# Patient Record
Sex: MALE
[De-identification: names, ages, dates, MRNs, and addresses within clinical notes are randomized per-mention and may not be internally consistent; named-entity substitution may affect disease eponyms.]

---

## 2017-07-27 PROBLEM — Z00.00 ENCOUNTER FOR PREVENTIVE HEALTH EXAMINATION: Status: ACTIVE | Noted: 2017-07-27

## 2017-07-31 ENCOUNTER — APPOINTMENT (OUTPATIENT)
Dept: ENDOCRINOLOGY | Facility: CLINIC | Age: 32
End: 2017-07-31
Payer: COMMERCIAL

## 2017-07-31 VITALS
BODY MASS INDEX: 23.86 KG/M2 | WEIGHT: 180 LBS | HEART RATE: 76 BPM | DIASTOLIC BLOOD PRESSURE: 75 MMHG | SYSTOLIC BLOOD PRESSURE: 115 MMHG | HEIGHT: 73 IN

## 2017-07-31 DIAGNOSIS — Z80.3 FAMILY HISTORY OF MALIGNANT NEOPLASM OF BREAST: ICD-10-CM

## 2017-07-31 DIAGNOSIS — F17.200 NICOTINE DEPENDENCE, UNSPECIFIED, UNCOMPLICATED: ICD-10-CM

## 2017-07-31 DIAGNOSIS — Z80.42 FAMILY HISTORY OF MALIGNANT NEOPLASM OF PROSTATE: ICD-10-CM

## 2017-07-31 DIAGNOSIS — Z78.9 OTHER SPECIFIED HEALTH STATUS: ICD-10-CM

## 2017-07-31 PROCEDURE — 99406 BEHAV CHNG SMOKING 3-10 MIN: CPT

## 2017-07-31 PROCEDURE — 99204 OFFICE O/P NEW MOD 45 MIN: CPT | Mod: 25

## 2017-07-31 RX ORDER — GINGER ROOT/GINGER ROOT EXT 262.5 MG
CAPSULE ORAL
Refills: 0 | Status: ACTIVE | COMMUNITY

## 2017-07-31 RX ORDER — LEVOTHYROXINE SODIUM 50 UG/1
50 TABLET ORAL
Refills: 0 | Status: ACTIVE | COMMUNITY

## 2017-08-25 LAB
25(OH)D3 SERPL-MCNC: 33.4 NG/ML
ANION GAP SERPL CALC-SCNC: 14 MMOL/L
BUN SERPL-MCNC: 18 MG/DL
CALCIUM SERPL-MCNC: 8.9 MG/DL
CHLORIDE SERPL-SCNC: 103 MMOL/L
CHOLEST SERPL-MCNC: 170 MG/DL
CHOLEST/HDLC SERPL: 2.1 RATIO
CO2 SERPL-SCNC: 24 MMOL/L
CREAT SERPL-MCNC: 1.2 MG/DL
FSH SERPL-MCNC: 6.7 IU/L
GLUCOSE SERPL-MCNC: 107 MG/DL
HBA1C MFR BLD HPLC: 6.9 %
HDLC SERPL-MCNC: 80 MG/DL
LDLC SERPL CALC-MCNC: 82 MG/DL
POTASSIUM SERPL-SCNC: 4.4 MMOL/L
SODIUM SERPL-SCNC: 141 MMOL/L
T4 FREE SERPL-MCNC: 1.1 NG/DL
TESTOST BND SERPL-MCNC: 17.5 PG/ML
TESTOST SERPL-MCNC: 1003.5 NG/DL
TRIGL SERPL-MCNC: 41 MG/DL
TSH SERPL-ACNC: 2.46 UIU/ML

## 2017-10-27 ENCOUNTER — RX RENEWAL (OUTPATIENT)
Age: 32
End: 2017-10-27

## 2018-05-10 ENCOUNTER — APPOINTMENT (OUTPATIENT)
Dept: ENDOCRINOLOGY | Facility: CLINIC | Age: 33
End: 2018-05-10
Payer: COMMERCIAL

## 2018-05-10 VITALS — HEIGHT: 73 IN | BODY MASS INDEX: 26.11 KG/M2 | WEIGHT: 197 LBS

## 2018-05-10 PROCEDURE — 99214 OFFICE O/P EST MOD 30 MIN: CPT

## 2018-05-10 RX ORDER — LANCETS 30 GAUGE
EACH MISCELLANEOUS
Qty: 6 | Refills: 11 | Status: ACTIVE | COMMUNITY
Start: 2018-05-10 | End: 1900-01-01

## 2018-05-11 LAB
25(OH)D3 SERPL-MCNC: 26.4 NG/ML
ALBUMIN SERPL ELPH-MCNC: 4.8 G/DL
ALP BLD-CCNC: 48 U/L
ALT SERPL-CCNC: 24 U/L
ANION GAP SERPL CALC-SCNC: 15 MMOL/L
AST SERPL-CCNC: 31 U/L
BASOPHILS # BLD AUTO: 0.03 K/UL
BASOPHILS NFR BLD AUTO: 0.5 %
BILIRUB SERPL-MCNC: 0.7 MG/DL
BUN SERPL-MCNC: 16 MG/DL
CALCIUM SERPL-MCNC: 9.6 MG/DL
CHLORIDE SERPL-SCNC: 101 MMOL/L
CHOLEST SERPL-MCNC: 158 MG/DL
CHOLEST/HDLC SERPL: 2.2 RATIO
CO2 SERPL-SCNC: 25 MMOL/L
CREAT SERPL-MCNC: 1.02 MG/DL
CREAT SPEC-SCNC: 124 MG/DL
EOSINOPHIL # BLD AUTO: 0.05 K/UL
EOSINOPHIL NFR BLD AUTO: 0.8 %
GLUCOSE SERPL-MCNC: 48 MG/DL
HCT VFR BLD CALC: 42.9 %
HDLC SERPL-MCNC: 72 MG/DL
HGB BLD-MCNC: 14.7 G/DL
IMM GRANULOCYTES NFR BLD AUTO: 0.2 %
LDLC SERPL CALC-MCNC: 79 MG/DL
LYMPHOCYTES # BLD AUTO: 2.3 K/UL
LYMPHOCYTES NFR BLD AUTO: 35.5 %
MAN DIFF?: NORMAL
MCHC RBC-ENTMCNC: 31.3 PG
MCHC RBC-ENTMCNC: 34.3 GM/DL
MCV RBC AUTO: 91.3 FL
MICROALBUMIN 24H UR DL<=1MG/L-MCNC: 0.3 MG/DL
MICROALBUMIN/CREAT 24H UR-RTO: 2 MG/G
MONOCYTES # BLD AUTO: 0.66 K/UL
MONOCYTES NFR BLD AUTO: 10.2 %
NEUTROPHILS # BLD AUTO: 3.43 K/UL
NEUTROPHILS NFR BLD AUTO: 52.8 %
PLATELET # BLD AUTO: 225 K/UL
POTASSIUM SERPL-SCNC: 4.4 MMOL/L
PROT SERPL-MCNC: 7.1 G/DL
RBC # BLD: 4.7 M/UL
RBC # FLD: 12.6 %
SODIUM SERPL-SCNC: 141 MMOL/L
T3 SERPL-MCNC: 100 NG/DL
T4 FREE SERPL-MCNC: 1.1 NG/DL
TRIGL SERPL-MCNC: 37 MG/DL
TSH SERPL-ACNC: 2.21 UIU/ML
WBC # FLD AUTO: 6.48 K/UL

## 2018-05-18 RX ORDER — BLOOD-GLUCOSE METER
KIT MISCELLANEOUS
Qty: 1 | Refills: 0 | Status: ACTIVE | COMMUNITY
Start: 2018-05-18 | End: 1900-01-01

## 2018-09-18 ENCOUNTER — RX RENEWAL (OUTPATIENT)
Age: 33
End: 2018-09-18

## 2019-01-23 ENCOUNTER — RX RENEWAL (OUTPATIENT)
Age: 34
End: 2019-01-23

## 2019-01-29 ENCOUNTER — RX RENEWAL (OUTPATIENT)
Age: 34
End: 2019-01-29

## 2019-01-29 RX ORDER — BLOOD SUGAR DIAGNOSTIC
STRIP MISCELLANEOUS
Qty: 600 | Refills: 0 | Status: ACTIVE | COMMUNITY
Start: 2017-10-09 | End: 1900-01-01

## 2019-02-04 ENCOUNTER — RX CHANGE (OUTPATIENT)
Age: 34
End: 2019-02-04

## 2019-02-04 RX ORDER — INSULIN LISPRO 100 [IU]/ML
100 INJECTION, SOLUTION INTRAVENOUS; SUBCUTANEOUS
Qty: 3 | Refills: 3 | Status: DISCONTINUED | COMMUNITY
End: 2019-02-04

## 2019-03-13 ENCOUNTER — APPOINTMENT (OUTPATIENT)
Dept: ENDOCRINOLOGY | Facility: CLINIC | Age: 34
End: 2019-03-13
Payer: COMMERCIAL

## 2019-03-13 VITALS
BODY MASS INDEX: 26.77 KG/M2 | HEART RATE: 60 BPM | HEIGHT: 73 IN | DIASTOLIC BLOOD PRESSURE: 67 MMHG | WEIGHT: 202 LBS | SYSTOLIC BLOOD PRESSURE: 109 MMHG

## 2019-03-13 LAB
GLUCOSE BLDC GLUCOMTR-MCNC: 125
HBA1C MFR BLD HPLC: 6.5

## 2019-03-13 PROCEDURE — 99214 OFFICE O/P EST MOD 30 MIN: CPT | Mod: 25

## 2019-03-13 PROCEDURE — 83036 HEMOGLOBIN GLYCOSYLATED A1C: CPT | Mod: QW

## 2019-03-13 PROCEDURE — 82962 GLUCOSE BLOOD TEST: CPT

## 2019-03-13 PROCEDURE — 36415 COLL VENOUS BLD VENIPUNCTURE: CPT

## 2019-03-14 NOTE — PHYSICAL EXAM
[No Acute Distress] : no acute distress [Well Nourished] : well nourished [Well Developed] : well developed [EOMI] : extra ocular movement intact [No Proptosis] : no proptosis [Normal Oropharynx] : the oropharynx was normal [Supple] : the neck was supple [No LAD] : no lymphadenopathy [Thyroid Not Enlarged] : the thyroid was not enlarged [No Thyroid Nodules] : there were no palpable thyroid nodules [Normal Rate and Effort] : normal respiratory rhythm and effort [No Accessory Muscle Use] : no accessory muscle use [Clear to Auscultation] : lungs were clear to auscultation bilaterally [Normal Rate] : heart rate was normal  [Normal S1, S2] : normal S1 and S2 [Regular Rhythm] : with a regular rhythm [Gynecomastia] : no gynecomastia [Not Tender] : non-tender [Soft] : abdomen soft [Anterior Cervical Nodes] : anterior cervical nodes [Spine Straight] : spine straight [No Stigmata of Cushings Syndrome] : no stigmata of cushings syndrome [Normal Gait] : normal gait [No Joint Swelling] : no joint swelling seen [No Rash] : no rash [No Skin Lesions] : no skin lesions [No Motor Deficits] : the motor exam was normal [No Sensory Deficits] : the sensory exam was normal to light touch and pinprick [Oriented x3] : oriented to person, place, and time [Normal Insight/Judgement] : insight and judgment were intact [Normal Affect] : the affect was normal [Normal Mood] : the mood was normal [de-identified] : No lipodystrophy at insulin injection site [de-identified] : Deferred [de-identified] : ~0.5 cm left level III calcified LN, unchanged for decades per pt

## 2019-03-14 NOTE — HISTORY OF PRESENT ILLNESS
[FreeTextEntry1] : 32 y/o M w/ Hx of DM1 and HTN presents for evaluation of multiple endocrine issues. \par \par \par Here for f/u, since his LV, he reports feeling well and endorses no acute complaints. He was diagnosed w/ DM1 at age 10. He reports compliance w. Lantus 18 units QHS + Humalog per carb counting (1:10 I to C ratio and corrects w/ ISF of 40). He monitors ~10 times per day. He reprots mostly his FSG are at goal, does endorse post prandial hyperglycemia in the 200 when preceded by carb heavy diet. At the time of this visi he had a low sugar episode w/ only reported symptoms being a headache. This has been happening often and he has required an increase in the frequency of his FSG monitoring. He reports absence of typical alarm signs/symptoms of hypoglycemia. No DKA episodes in the past. He denies any past hx of retinopathy, neuropathy or nephropathy.  \par \par 3/2019: Here for /fu, generally feels well and endorses no acute complaints. No interval events since LV. Today reports excellent glycemic control at home. He continues to monitor FSG 4-6 times daily. rare hypoglycemic episodes do occur but are rare and elicit symptoms. Post prandial FSG remain <160.\par He denies any HA, visual changes, gynecomastia, nipple discharge, hair changes, heat/cold intolerance or any other complaints. He otherwise denies any f/c, CP, SOB, palpitations, tremors, depressed mood, anxiety, palpitations, n/v, stool/urinary abn, skin/weight changes, heat/cold intolerance, HAs, breast/nipple changes, polyuria/polydipsia/nocturia or other complaints.\par

## 2019-03-14 NOTE — ASSESSMENT
[FreeTextEntry1] : 1) DM1 : Last A1C  at 7.2% on 5/2018, now well controlled at 6.3% on 3/2019. At target. No reported complications. Reported infrequent hypoglycemia. Appetite and diet appropriate. We discussed splitting his carbs more evenly to avoid post prandial hyperglycemia and to adjust his I to C to 12. I agree with the need for increased monitoring at least 10 times daily.  Ophtho referral provided, has Podiatry referral, now UTD for 2019. Evaluate annual microalbumin and lipid panel (not on a statin). We discussed CGM option, he has one but feels it doesn’t fit well w/ lifestyle. Reassess in 6 months.\par \par 2) Hypothyroidism: Appears clinically euthyroid. Reassess TFTs. Continue current dose for now.\par \par \par 2) Tobacco use disorder: Discussed quitting, he reports reducing his cigarette use to 1-2 a week, therapeutic options discussed. He'll attempt to quit w/o aids at this time. Reassess on NV. [Carbohydrate Consistent Diet] : carbohydrate consistent diet [Hypoglycemia Management] : hypoglycemia management [Action and use of Insulin] : action and use of short and long-acting insulin [Self Monitoring of Blood Glucose] : self monitoring of blood glucose

## 2019-03-15 LAB
ALBUMIN SERPL ELPH-MCNC: 4.8 G/DL
ALP BLD-CCNC: 43 U/L
ALT SERPL-CCNC: 22 U/L
ANION GAP SERPL CALC-SCNC: 17 MMOL/L
AST SERPL-CCNC: 24 U/L
BILIRUB SERPL-MCNC: 0.6 MG/DL
BUN SERPL-MCNC: 13 MG/DL
CALCIUM SERPL-MCNC: 9.5 MG/DL
CHLORIDE SERPL-SCNC: 102 MMOL/L
CHOLEST SERPL-MCNC: 168 MG/DL
CHOLEST/HDLC SERPL: 2.4 RATIO
CO2 SERPL-SCNC: 26 MMOL/L
CREAT SERPL-MCNC: 1.13 MG/DL
CREAT SPEC-SCNC: 128 MG/DL
GLUCOSE SERPL-MCNC: 59 MG/DL
HDLC SERPL-MCNC: 69 MG/DL
LDLC SERPL CALC-MCNC: 93 MG/DL
MICROALBUMIN 24H UR DL<=1MG/L-MCNC: <1.2 MG/DL
MICROALBUMIN/CREAT 24H UR-RTO: NORMAL MG/G
POTASSIUM SERPL-SCNC: 3.9 MMOL/L
PROT SERPL-MCNC: 6.6 G/DL
SODIUM SERPL-SCNC: 144 MMOL/L
T4 FREE SERPL-MCNC: 1.2 NG/DL
TRIGL SERPL-MCNC: 32 MG/DL
TSH SERPL-ACNC: 1.76 UIU/ML

## 2019-04-23 ENCOUNTER — RX RENEWAL (OUTPATIENT)
Age: 34
End: 2019-04-23

## 2019-04-23 RX ORDER — PEN NEEDLE, DIABETIC 29 G X1/2"
31G X 8 MM NEEDLE, DISPOSABLE MISCELLANEOUS
Qty: 400 | Refills: 3 | Status: ACTIVE | COMMUNITY
Start: 2018-01-19 | End: 1900-01-01

## 2019-06-04 ENCOUNTER — RX RENEWAL (OUTPATIENT)
Age: 34
End: 2019-06-04

## 2019-08-07 ENCOUNTER — RX RENEWAL (OUTPATIENT)
Age: 34
End: 2019-08-07

## 2019-08-27 ENCOUNTER — OTHER (OUTPATIENT)
Age: 34
End: 2019-08-27

## 2019-10-30 ENCOUNTER — RX RENEWAL (OUTPATIENT)
Age: 34
End: 2019-10-30

## 2019-12-03 ENCOUNTER — RX RENEWAL (OUTPATIENT)
Age: 34
End: 2019-12-03

## 2019-12-04 ENCOUNTER — RX RENEWAL (OUTPATIENT)
Age: 34
End: 2019-12-04

## 2020-01-31 ENCOUNTER — APPOINTMENT (OUTPATIENT)
Dept: ENDOCRINOLOGY | Facility: CLINIC | Age: 35
End: 2020-01-31
Payer: COMMERCIAL

## 2020-01-31 VITALS
SYSTOLIC BLOOD PRESSURE: 120 MMHG | WEIGHT: 214 LBS | HEART RATE: 68 BPM | HEIGHT: 73 IN | BODY MASS INDEX: 28.36 KG/M2 | DIASTOLIC BLOOD PRESSURE: 76 MMHG

## 2020-01-31 LAB
GLUCOSE BLDC GLUCOMTR-MCNC: 89
HBA1C MFR BLD HPLC: 6.9

## 2020-01-31 PROCEDURE — 83036 HEMOGLOBIN GLYCOSYLATED A1C: CPT | Mod: QW

## 2020-01-31 PROCEDURE — 82962 GLUCOSE BLOOD TEST: CPT

## 2020-01-31 PROCEDURE — 99215 OFFICE O/P EST HI 40 MIN: CPT | Mod: 25

## 2020-01-31 RX ORDER — BLOOD-GLUCOSE TRANSMITTER
EACH MISCELLANEOUS
Qty: 1 | Refills: 3 | Status: ACTIVE | COMMUNITY
Start: 2020-01-31 | End: 1900-01-01

## 2020-01-31 RX ORDER — BLOOD-GLUCOSE,RECEIVER,CONT
EACH MISCELLANEOUS
Qty: 1 | Refills: 0 | Status: ACTIVE | COMMUNITY
Start: 2020-01-31 | End: 1900-01-01

## 2020-01-31 RX ORDER — BLOOD-GLUCOSE SENSOR
EACH MISCELLANEOUS
Qty: 1 | Refills: 11 | Status: ACTIVE | COMMUNITY
Start: 2020-01-31 | End: 1900-01-01

## 2020-01-31 NOTE — ASSESSMENT
[FreeTextEntry1] : 1) DM1 : Last A1C  at 6.9% on 1/2020. At target. No reported complications. Reported frequent hypoglycemia w/o alarm symptoms in spite of monitoring finger stick glucose ~15 times daily. Appetite and diet appropriate. We discussed splitting his carbs more evenly to avoid post prandial hyperglycemia and to adjust his I to C to 12. I agree with the need for increased monitoring at least 10 times daily.  Ophtho referral provided, has Podiatry referral, ow UTD for 2019. Evaluate annual microalbumin and lipid panel (not on a statin). Given glucose variability and hypoglycemia unawareness in spite of FSG monitoring 15 times daily, he benefits from CGM monitoring. G6 device prescribed.\par \par 2) Hypothyroidism: Appears clinically euthyroid. Reassess TFTs. Continue current dose for now.\par \par 2) Tobacco use disorder: Discussed quitting, he reports reducing his cigarette use to 1-2 a week, therapeutic options discussed. He'll attempt to quit w/o aids at this time. Reassess on NV. [Carbohydrate Consistent Diet] : carbohydrate consistent diet [Hypoglycemia Management] : hypoglycemia management [Action and use of Insulin] : action and use of short and long-acting insulin [Self Monitoring of Blood Glucose] : self monitoring of blood glucose

## 2020-01-31 NOTE — PHYSICAL EXAM
[No Acute Distress] : no acute distress [Well Nourished] : well nourished [Well Developed] : well developed [EOMI] : extra ocular movement intact [No Proptosis] : no proptosis [Normal Oropharynx] : the oropharynx was normal [Supple] : the neck was supple [No LAD] : no lymphadenopathy [Thyroid Not Enlarged] : the thyroid was not enlarged [No Thyroid Nodules] : there were no palpable thyroid nodules [Normal Rate and Effort] : normal respiratory rhythm and effort [No Accessory Muscle Use] : no accessory muscle use [Clear to Auscultation] : lungs were clear to auscultation bilaterally [Normal Rate] : heart rate was normal  [Normal S1, S2] : normal S1 and S2 [Regular Rhythm] : with a regular rhythm [Gynecomastia] : no gynecomastia [Not Tender] : non-tender [Soft] : abdomen soft [Anterior Cervical Nodes] : anterior cervical nodes [Spine Straight] : spine straight [No Stigmata of Cushings Syndrome] : no stigmata of cushings syndrome [Normal Gait] : normal gait [No Joint Swelling] : no joint swelling seen [No Rash] : no rash [No Skin Lesions] : no skin lesions [No Motor Deficits] : the motor exam was normal [No Sensory Deficits] : the sensory exam was normal to light touch and pinprick [Oriented x3] : oriented to person, place, and time [Normal Insight/Judgement] : insight and judgment were intact [Normal Affect] : the affect was normal [Normal Mood] : the mood was normal [de-identified] : No lipodystrophy at insulin injection site [de-identified] : Deferred [de-identified] : ~0.5 cm left level III calcified LN, unchanged for decades per pt

## 2020-01-31 NOTE — HISTORY OF PRESENT ILLNESS
[FreeTextEntry1] : 35 y/o M w/ Hx of DM1 and HTN presents for evaluation of multiple endocrine issues. \par \par \par Here for f/u, since his LV, he reports feeling well and endorses no acute complaints. He was diagnosed w/ DM1 at age 10. He reports compliance w. Lantus 18 units QHS + Humalog per carb counting (1:10 I to C ratio and corrects w/ ISF of 40). He monitors ~10 times per day. He reprots mostly his FSG are at goal, does endorse post prandial hyperglycemia in the 200 when preceded by carb heavy diet. At the time of this visi he had a low sugar episode w/ only reported symptoms being a headache. This has been happening often and he has required an increase in the frequency of his FSG monitoring. He reports absence of typical alarm signs/symptoms of hypoglycemia. No DKA episodes in the past. He denies any past hx of retinopathy, neuropathy or nephropathy.  \par \par 1/2020: Here for /fu, generally feels well and endorses no acute complaints. No interval events since LV. Today reports excellent glycemic control at home. He continues to monitor FSG 15 times daily. hypoglycemic episodes do occur but do not elicit symptoms. Post prandial FSG remain <160.\par He denies any HA, visual changes, gynecomastia, nipple discharge, hair changes, heat/cold intolerance or any other complaints. He otherwise denies any f/c, CP, SOB, palpitations, tremors, depressed mood, anxiety, palpitations, n/v, stool/urinary abn, skin/weight changes, heat/cold intolerance, HAs, breast/nipple changes, polyuria/polydipsia/nocturia or other complaints.\par

## 2020-02-07 LAB
25(OH)D3 SERPL-MCNC: 27.3 NG/ML
ALBUMIN SERPL ELPH-MCNC: 5 G/DL
ALP BLD-CCNC: 49 U/L
ALT SERPL-CCNC: 17 U/L
ANION GAP SERPL CALC-SCNC: 14 MMOL/L
APO LP(A) SERPL-MCNC: <9 NMOL/L
AST SERPL-CCNC: 21 U/L
BILIRUB SERPL-MCNC: 0.7 MG/DL
BUN SERPL-MCNC: 16 MG/DL
CALCIUM SERPL-MCNC: 9.7 MG/DL
CHLORIDE SERPL-SCNC: 105 MMOL/L
CHOLEST SERPL-MCNC: 177 MG/DL
CHOLEST/HDLC SERPL: 2.3 RATIO
CO2 SERPL-SCNC: 23 MMOL/L
CREAT SERPL-MCNC: 1.08 MG/DL
CREAT SPEC-SCNC: 305 MG/DL
CRP SERPL HS-MCNC: 0.48 MG/L
GLUCOSE SERPL-MCNC: 80 MG/DL
HDLC SERPL-MCNC: 77 MG/DL
LDLC SERPL CALC-MCNC: 92 MG/DL
MICROALBUMIN 24H UR DL<=1MG/L-MCNC: 1.3 MG/DL
MICROALBUMIN/CREAT 24H UR-RTO: 4 MG/G
POTASSIUM SERPL-SCNC: 4.4 MMOL/L
PROT SERPL-MCNC: 6.7 G/DL
SODIUM SERPL-SCNC: 142 MMOL/L
T4 FREE SERPL-MCNC: 1.3 NG/DL
TRIGL SERPL-MCNC: 42 MG/DL
TSH SERPL-ACNC: 2.05 UIU/ML

## 2020-02-28 ENCOUNTER — RX RENEWAL (OUTPATIENT)
Age: 35
End: 2020-02-28

## 2020-03-23 ENCOUNTER — RX RENEWAL (OUTPATIENT)
Age: 35
End: 2020-03-23

## 2020-05-08 ENCOUNTER — RX RENEWAL (OUTPATIENT)
Age: 35
End: 2020-05-08

## 2020-06-02 ENCOUNTER — RX RENEWAL (OUTPATIENT)
Age: 35
End: 2020-06-02

## 2020-07-31 ENCOUNTER — APPOINTMENT (OUTPATIENT)
Dept: ENDOCRINOLOGY | Facility: CLINIC | Age: 35
End: 2020-07-31

## 2020-08-05 ENCOUNTER — RX RENEWAL (OUTPATIENT)
Age: 35
End: 2020-08-05

## 2020-08-19 ENCOUNTER — APPOINTMENT (OUTPATIENT)
Dept: ENDOCRINOLOGY | Facility: CLINIC | Age: 35
End: 2020-08-19
Payer: COMMERCIAL

## 2020-08-19 VITALS
WEIGHT: 221 LBS | BODY MASS INDEX: 29.29 KG/M2 | SYSTOLIC BLOOD PRESSURE: 113 MMHG | HEART RATE: 64 BPM | DIASTOLIC BLOOD PRESSURE: 83 MMHG | HEIGHT: 73 IN

## 2020-08-19 LAB
GLUCOSE BLDC GLUCOMTR-MCNC: 159
HBA1C MFR BLD HPLC: 7.1

## 2020-08-19 PROCEDURE — 99215 OFFICE O/P EST HI 40 MIN: CPT | Mod: 25

## 2020-08-19 PROCEDURE — 82962 GLUCOSE BLOOD TEST: CPT

## 2020-08-19 PROCEDURE — 83036 HEMOGLOBIN GLYCOSYLATED A1C: CPT | Mod: QW

## 2020-08-27 LAB
T3 SERPL-MCNC: 102 NG/DL
T4 FREE SERPL-MCNC: 1.2 NG/DL
TSH SERPL-ACNC: 2.51 UIU/ML

## 2020-09-02 ENCOUNTER — RX RENEWAL (OUTPATIENT)
Age: 35
End: 2020-09-02

## 2020-09-16 NOTE — HISTORY OF PRESENT ILLNESS
[FreeTextEntry1] : 33 y/o M w/ Hx of DM1 and HTN presents for evaluation of multiple endocrine issues. \par \par \par Here for f/u, since his LV, he reports feeling well and endorses no acute complaints. He was diagnosed w/ DM1 at age 10. He reports compliance w. Lantus 18 units QHS + Humalog per carb counting (1:10 I to C ratio and corrects w/ ISF of 40). He monitors ~10 times per day. He reprots mostly his FSG are at goal, does endorse post prandial hyperglycemia in the 200 when preceded by carb heavy diet. At the time of this visi he had a low sugar episode w/ only reported symptoms being a headache. This has been happening often and he has required an increase in the frequency of his FSG monitoring. He reports absence of typical alarm signs/symptoms of hypoglycemia. No DKA episodes in the past. He denies any past hx of retinopathy, neuropathy or nephropathy.  \par \par 8/2020: Here for /fu, generally feels well and endorses no acute complaints. No interval events since LV. Today reports excellent glycemic control at home. He continues to monitor FSG 15 times daily. hypoglycemic episodes do occur but do not elicit symptoms. Post prandial FSG remain <160. weight gain noted, reported as 2/2 reduced physical activity during pandemic.\par He denies any HA, visual changes, gynecomastia, nipple discharge, hair changes, heat/cold intolerance or any other complaints. He otherwise denies any f/c, CP, SOB, palpitations, tremors, depressed mood, anxiety, palpitations, n/v, stool/urinary abn, skin/weight changes, heat/cold intolerance, HAs, breast/nipple changes, polyuria/polydipsia/nocturia or other complaints.\par

## 2020-09-16 NOTE — ASSESSMENT
[Carbohydrate Consistent Diet] : carbohydrate consistent diet [Hypoglycemia Management] : hypoglycemia management [Action and use of Insulin] : action and use of short and long-acting insulin [Self Monitoring of Blood Glucose] : self monitoring of blood glucose [FreeTextEntry1] : 1) DM1 : Last A1C  at 6.9% on 1/2020. At target. No reported complications. Reported frequent hypoglycemia w/o alarm symptoms in spite of monitoring finger stick glucose ~15 times daily. Appetite and diet appropriate. We discussed splitting his carbs more evenly to avoid post prandial hyperglycemia and to adjust his I to C to 12. I agree with the need for increased monitoring at least 10 times daily.  Ophtho referral provided, has Podiatry referral, ow UTD for 2020. Evaluate annual microalbumin and lipid panel (not on a statin). Given glucose variability and hypoglycemia unawareness in spite of FSG monitoring 15 times daily, he benefits from CGM monitoring. G6 device prescribed. consider metformin to reduce insulin resistence and aid with weight gain in the future. .insulni\par \par \par 2) Hypothyroidism: Appears clinically euthyroid. Reassess TFTs. Continue current dose for now.\par \par 2) Tobacco use disorder: Discussed quitting, he reports reducing his cigarette use to 1-2 a week, therapeutic options discussed. He'll attempt to quit w/o aids at this time. Reassess on NV.

## 2020-12-03 ENCOUNTER — RX RENEWAL (OUTPATIENT)
Age: 35
End: 2020-12-03

## 2020-12-08 ENCOUNTER — RX RENEWAL (OUTPATIENT)
Age: 35
End: 2020-12-08

## 2021-01-25 ENCOUNTER — RX RENEWAL (OUTPATIENT)
Age: 36
End: 2021-01-25

## 2021-02-02 ENCOUNTER — APPOINTMENT (OUTPATIENT)
Dept: ENDOCRINOLOGY | Facility: CLINIC | Age: 36
End: 2021-02-02

## 2021-03-04 ENCOUNTER — RX RENEWAL (OUTPATIENT)
Age: 36
End: 2021-03-04

## 2021-03-17 ENCOUNTER — APPOINTMENT (OUTPATIENT)
Dept: ENDOCRINOLOGY | Facility: CLINIC | Age: 36
End: 2021-03-17
Payer: COMMERCIAL

## 2021-03-17 VITALS
HEIGHT: 73 IN | WEIGHT: 228 LBS | DIASTOLIC BLOOD PRESSURE: 82 MMHG | SYSTOLIC BLOOD PRESSURE: 125 MMHG | BODY MASS INDEX: 30.22 KG/M2 | HEART RATE: 59 BPM

## 2021-03-17 LAB
GLUCOSE BLDC GLUCOMTR-MCNC: 200
HBA1C MFR BLD HPLC: 6.7

## 2021-03-17 PROCEDURE — 99215 OFFICE O/P EST HI 40 MIN: CPT | Mod: 25

## 2021-03-17 PROCEDURE — 83036 HEMOGLOBIN GLYCOSYLATED A1C: CPT | Mod: QW

## 2021-03-17 PROCEDURE — 82962 GLUCOSE BLOOD TEST: CPT

## 2021-03-17 PROCEDURE — 99072 ADDL SUPL MATRL&STAF TM PHE: CPT

## 2021-03-19 NOTE — HISTORY OF PRESENT ILLNESS
[FreeTextEntry1] : 34 y/o M w/ Hx of DM1 and HTN presents for evaluation of multiple endocrine issues. \par \par \par Here for f/u, since his LV, he reports feeling well and endorses no acute complaints. He was diagnosed w/ DM1 at age 10. He reports compliance w. Lantus 18 units QHS + Humalog per carb counting (1:10 I to C ratio and corrects w/ ISF of 40). He monitors ~10 times per day. He reprots mostly his FSG are at goal, does endorse post prandial hyperglycemia in the 200 when preceded by carb heavy diet. At the time of this visi he had a low sugar episode w/ only reported symptoms being a headache. This has been happening often and he has required an increase in the frequency of his FSG monitoring. He reports absence of typical alarm signs/symptoms of hypoglycemia. No DKA episodes in the past. He denies any past hx of retinopathy, neuropathy or nephropathy.  \par \par 3/2021: Here for /fu, generally feels well and endorses no acute complaints. No interval events since LV. Today reports excellent glycemic control at home. He continues to monitor FSG 15 times daily. hypoglycemic episodes do occur but do not elicit symptoms. Post prandial FSG remain <160. weight gain noted, reported as 2/2 reduced physical activity during pandemic.\par He denies any HA, visual changes, gynecomastia, nipple discharge, hair changes, heat/cold intolerance or any other complaints. He otherwise denies any f/c, CP, SOB, palpitations, tremors, depressed mood, anxiety, palpitations, n/v, stool/urinary abn, skin/weight changes, heat/cold intolerance, HAs, breast/nipple changes, polyuria/polydipsia/nocturia or other complaints.\par

## 2021-03-19 NOTE — ASSESSMENT
[FreeTextEntry1] : 1) DM1 : Last A1C  at 6.9% on 1/2020. At target. No reported complications. Reported frequent hypoglycemia w/o alarm symptoms in spite of monitoring finger stick glucose ~15 times daily. Appetite and diet appropriate. We discussed splitting his carbs more evenly to avoid post prandial hyperglycemia and to adjust his I to C to 12. I agree with the need for increased monitoring at least 10 times daily.  Ophtho referral provided, has Podiatry referral, ow UTD for 2020. Evaluate annual microalbumin and lipid panel (not on a statin). Given glucose variability and hypoglycemia unawareness in spite of FSG monitoring 15 times daily, he benefits from CGM monitoring. G6 device prescribed. consider metformin to reduce insulin resistence and aid with weight gain in the future. explained the potential high risk and toxicities with chronic insulin use including, but not limited to life threatening hypoglycemia and death, Verbalized understanding and agrees with treatment plan, will contact MD and seek emergency medical care if condition changes.\par \par 1) Obesity, Morbid: Class I, complicated by DM1. High risk of metabolic syndrome and future complications. Discussed options including meds, bariatric surgery and lifestyle modification. RB and alternatives discussed. Questions answered and she verbalized understanding. Refer to nutrition and start hypocaloric, hypocarb diet in addition to exercise regimen. Refer to  now. . If no 5-7% weight loss observed on f/u, will consider GLP-1 agonist initiation.\par \par \par \par 2) Hypothyroidism: Appears clinically euthyroid. Reassess TFTs. Continue current dose for now.\par \par 2) Tobacco use disorder: Discussed quitting, he reports reducing his cigarette use to 1-2 a week, therapeutic options discussed. He'll attempt to quit w/o aids at this time. Reassess on NV. [Carbohydrate Consistent Diet] : carbohydrate consistent diet [Hypoglycemia Management] : hypoglycemia management [Action and use of Insulin] : action and use of short and long-acting insulin [Self Monitoring of Blood Glucose] : self monitoring of blood glucose

## 2021-03-26 LAB
T3 SERPL-MCNC: 94 NG/DL
T4 FREE SERPL-MCNC: 1.2 NG/DL
TSH SERPL-ACNC: 1.72 UIU/ML

## 2021-05-06 ENCOUNTER — RX RENEWAL (OUTPATIENT)
Age: 36
End: 2021-05-06

## 2021-06-08 ENCOUNTER — RX RENEWAL (OUTPATIENT)
Age: 36
End: 2021-06-08

## 2021-07-13 ENCOUNTER — RX RENEWAL (OUTPATIENT)
Age: 36
End: 2021-07-13

## 2021-08-09 ENCOUNTER — RX RENEWAL (OUTPATIENT)
Age: 36
End: 2021-08-09

## 2021-11-10 ENCOUNTER — APPOINTMENT (OUTPATIENT)
Dept: ENDOCRINOLOGY | Facility: CLINIC | Age: 36
End: 2021-11-10
Payer: COMMERCIAL

## 2021-11-10 VITALS
DIASTOLIC BLOOD PRESSURE: 75 MMHG | HEART RATE: 65 BPM | BODY MASS INDEX: 30.21 KG/M2 | WEIGHT: 229 LBS | SYSTOLIC BLOOD PRESSURE: 117 MMHG

## 2021-11-10 DIAGNOSIS — E66.9 OBESITY, UNSPECIFIED: ICD-10-CM

## 2021-11-10 LAB
GLUCOSE BLDC GLUCOMTR-MCNC: 134
HBA1C MFR BLD HPLC: 7.1

## 2021-11-10 PROCEDURE — 83036 HEMOGLOBIN GLYCOSYLATED A1C: CPT | Mod: QW

## 2021-11-10 PROCEDURE — 99215 OFFICE O/P EST HI 40 MIN: CPT | Mod: 25

## 2021-11-10 PROCEDURE — 82962 GLUCOSE BLOOD TEST: CPT

## 2021-11-12 NOTE — HISTORY OF PRESENT ILLNESS
[FreeTextEntry1] : 37 y/o M w/ Hx of DM1 and HTN presents for evaluation of multiple endocrine issues. \par \par 11/2021\par Here for f/u, since his LV, he reports feeling well and endorses no acute complaints. He was diagnosed w/ DM1 at age 10. He reports compliance w. Lantus 18 units QHS + Humalog per carb counting (1:10 I to C ratio and corrects w/ ISF of 40). He monitors ~10 times per day. He reprots mostly his FSG are at goal, does endorse post prandial hyperglycemia in the 200 when preceded by carb heavy diet. At the time of this visi he had a low sugar episode w/ only reported symptoms being a headache. This has been happening often and he has required an increase in the frequency of his FSG monitoring. He reports absence of typical alarm signs/symptoms of hypoglycemia. No DKA episodes in the past. He denies any past hx of retinopathy, neuropathy or nephropathy.  \par \par 3/2021: Here for /fu, generally feels well and endorses no acute complaints. No interval events since LV. Today reports excellent glycemic control at home. He continues to monitor FSG 15 times daily. hypoglycemic episodes do occur but do not elicit symptoms. Post prandial FSG remain <160. weight gain noted, reported as 2/2 reduced physical activity during pandemic.\par He denies any HA, visual changes, gynecomastia, nipple discharge, hair changes, heat/cold intolerance or any other complaints. He otherwise denies any f/c, CP, SOB, palpitations, tremors, depressed mood, anxiety, palpitations, n/v, stool/urinary abn, skin/weight changes, heat/cold intolerance, HAs, breast/nipple changes, polyuria/polydipsia/nocturia or other complaints.\par

## 2021-11-17 LAB
25(OH)D3 SERPL-MCNC: 43 NG/ML
ALBUMIN SERPL ELPH-MCNC: 4.8 G/DL
ALP BLD-CCNC: 51 U/L
ALT SERPL-CCNC: 19 U/L
ANION GAP SERPL CALC-SCNC: 12 MMOL/L
AST SERPL-CCNC: 20 U/L
BILIRUB SERPL-MCNC: 0.6 MG/DL
BUN SERPL-MCNC: 15 MG/DL
CALCIUM SERPL-MCNC: 9.3 MG/DL
CHLORIDE SERPL-SCNC: 102 MMOL/L
CHOLEST SERPL-MCNC: 172 MG/DL
CO2 SERPL-SCNC: 23 MMOL/L
CREAT SERPL-MCNC: 1.14 MG/DL
CREAT SPEC-SCNC: 74 MG/DL
GLUCOSE SERPL-MCNC: 143 MG/DL
HDLC SERPL-MCNC: 72 MG/DL
LDLC SERPL CALC-MCNC: 92 MG/DL
MICROALBUMIN 24H UR DL<=1MG/L-MCNC: <1.2 MG/DL
MICROALBUMIN/CREAT 24H UR-RTO: NORMAL MG/G
NONHDLC SERPL-MCNC: 100 MG/DL
POTASSIUM SERPL-SCNC: 4.5 MMOL/L
PROT SERPL-MCNC: 6.7 G/DL
SODIUM SERPL-SCNC: 138 MMOL/L
T3 SERPL-MCNC: 97 NG/DL
T4 FREE SERPL-MCNC: 1.4 NG/DL
TRIGL SERPL-MCNC: 40 MG/DL
TSH SERPL-ACNC: 2.04 UIU/ML

## 2021-12-07 ENCOUNTER — RX RENEWAL (OUTPATIENT)
Age: 36
End: 2021-12-07

## 2021-12-21 ENCOUNTER — RX RENEWAL (OUTPATIENT)
Age: 36
End: 2021-12-21

## 2022-07-13 ENCOUNTER — NON-APPOINTMENT (OUTPATIENT)
Age: 37
End: 2022-07-13

## 2022-07-13 ENCOUNTER — APPOINTMENT (OUTPATIENT)
Dept: ENDOCRINOLOGY | Facility: CLINIC | Age: 37
End: 2022-07-13

## 2022-07-13 VITALS
WEIGHT: 222 LBS | BODY MASS INDEX: 29.42 KG/M2 | DIASTOLIC BLOOD PRESSURE: 80 MMHG | HEART RATE: 79 BPM | HEIGHT: 73 IN | SYSTOLIC BLOOD PRESSURE: 117 MMHG

## 2022-07-13 LAB
GLUCOSE BLDC GLUCOMTR-MCNC: 125
HBA1C MFR BLD HPLC: 7.5

## 2022-07-13 PROCEDURE — 83036 HEMOGLOBIN GLYCOSYLATED A1C: CPT | Mod: QW

## 2022-07-13 PROCEDURE — 99215 OFFICE O/P EST HI 40 MIN: CPT | Mod: 25

## 2022-07-13 PROCEDURE — 82962 GLUCOSE BLOOD TEST: CPT

## 2022-07-15 NOTE — HISTORY OF PRESENT ILLNESS
[FreeTextEntry1] : 35 y/o M w/ Hx of DM1 and HTN presents for evaluation of multiple endocrine issues. \par \par 7/2022\par Here for f/u, since his LV, he reports feeling well and endorses no acute complaints. He was diagnosed w/ DM1 at age 10. He reports compliance w. Lantus 18 units QHS + Humalog per carb counting (1:10 I to C ratio and corrects w/ ISF of 40). He monitors ~10 times per day. He reprots mostly his FSG are at goal, does endorse post prandial hyperglycemia in the 200 when preceded by carb heavy diet. At the time of this visi he had a low sugar episode w/ only reported symptoms being a headache. This has been happening often and he has required an increase in the frequency of his FSG monitoring. He reports absence of typical alarm signs/symptoms of hypoglycemia. No DKA episodes in the past. He denies any past hx of retinopathy, neuropathy or nephropathy.  \par \par 3/2021: Here for /fu, generally feels well and endorses no acute complaints. No interval events since LV. Today reports excellent glycemic control at home. He continues to monitor FSG 15 times daily. hypoglycemic episodes do occur but do not elicit symptoms. Post prandial FSG remain <160. weight gain noted, reported as 2/2 reduced physical activity during pandemic.\par He denies any HA, visual changes, gynecomastia, nipple discharge, hair changes, heat/cold intolerance or any other complaints. He otherwise denies any f/c, CP, SOB, palpitations, tremors, depressed mood, anxiety, palpitations, n/v, stool/urinary abn, skin/weight changes, heat/cold intolerance, HAs, breast/nipple changes, polyuria/polydipsia/nocturia or other complaints.\par

## 2022-07-15 NOTE — ASSESSMENT
[FreeTextEntry1] : 1) DM1 :. A1C slightly above target. No reported complications. Reported frequent hypoglycemia w/o alarm symptoms in spite of monitoring finger stick glucose ~15 times daily. Appetite and diet appropriate. We discussed splitting his carbs more evenly to avoid post prandial hyperglycemia and to adjust his I to C to 12. I agree with the need for increased monitoring at least 10 times daily.  Ophtho referral provided, has Podiatry referral,  UTD for 2022. Evaluate annual microalbumin and lipid panel (not on a statin). Given glucose variability and hypoglycemia unawareness in spite of FSG monitoring 15 times daily, he benefits from CGM monitoring. Dariel 3 device prescribed. consider metformin to reduce insulin resistence and aid with weight gain in the future. explained the potential high risk and toxicities with chronic insulin use including, but not limited to life threatening hypoglycemia and death, Verbalized understanding and agrees with treatment plan, will contact MD and seek emergency medical care if condition changes.\par \par 2) overweight: Class I, complicated by DM1. High risk of metabolic syndrome and future complications. Discussed options including meds, bariatric surgery and lifestyle modification. RB and alternatives discussed. Questions answered and she verbalized understanding. Refer to nutrition and start hypocaloric, hypocarb diet in addition to exercise regimen. Refer to  now. . If no 5-7% weight loss observed on f/u, will consider GLP-1 agonist initiation.\par \par \par \par 3) Hypothyroidism: Appears clinically euthyroid off of LT4. Reassess TFTs. reviewed s/s of hypothroidism\par \par  [Carbohydrate Consistent Diet] : carbohydrate consistent diet [Hypoglycemia Management] : hypoglycemia management [Action and use of Insulin] : action and use of short and long-acting insulin [Self Monitoring of Blood Glucose] : self monitoring of blood glucose

## 2022-07-20 LAB
25(OH)D3 SERPL-MCNC: 35.1 NG/ML
ALBUMIN SERPL ELPH-MCNC: 5 G/DL
ALP BLD-CCNC: 54 U/L
ALT SERPL-CCNC: 20 U/L
ANION GAP SERPL CALC-SCNC: 17 MMOL/L
AST SERPL-CCNC: 27 U/L
BILIRUB SERPL-MCNC: 1 MG/DL
BUN SERPL-MCNC: 19 MG/DL
CALCIUM SERPL-MCNC: 9.8 MG/DL
CHLORIDE SERPL-SCNC: 103 MMOL/L
CO2 SERPL-SCNC: 19 MMOL/L
CREAT SERPL-MCNC: 1.27 MG/DL
CREAT SPEC-SCNC: 315 MG/DL
EGFR: 74 ML/MIN/1.73M2
GLUCOSE SERPL-MCNC: 151 MG/DL
MICROALBUMIN 24H UR DL<=1MG/L-MCNC: 1.6 MG/DL
MICROALBUMIN/CREAT 24H UR-RTO: 5 MG/G
POTASSIUM SERPL-SCNC: 4.5 MMOL/L
PROT SERPL-MCNC: 7.2 G/DL
SODIUM SERPL-SCNC: 138 MMOL/L
T3 SERPL-MCNC: 103 NG/DL
T4 FREE SERPL-MCNC: 1.2 NG/DL
THYROGLOB AB SERPL-ACNC: <20 IU/ML
THYROPEROXIDASE AB SERPL IA-ACNC: <10 IU/ML
TSH SERPL-ACNC: 3.16 UIU/ML

## 2022-11-03 ENCOUNTER — RX RENEWAL (OUTPATIENT)
Age: 37
End: 2022-11-03

## 2022-12-28 ENCOUNTER — RX RENEWAL (OUTPATIENT)
Age: 37
End: 2022-12-28

## 2023-02-13 ENCOUNTER — NON-APPOINTMENT (OUTPATIENT)
Age: 38
End: 2023-02-13

## 2023-02-15 ENCOUNTER — NON-APPOINTMENT (OUTPATIENT)
Age: 38
End: 2023-02-15

## 2023-02-15 RX ORDER — FLASH GLUCOSE SENSOR
KIT MISCELLANEOUS
Qty: 1 | Refills: 0 | Status: ACTIVE | COMMUNITY
Start: 2022-07-13 | End: 1900-01-01

## 2023-03-21 ENCOUNTER — RX RENEWAL (OUTPATIENT)
Age: 38
End: 2023-03-21

## 2023-03-27 ENCOUNTER — RX RENEWAL (OUTPATIENT)
Age: 38
End: 2023-03-27

## 2023-06-13 RX ORDER — BLOOD-GLUCOSE METER
KIT MISCELLANEOUS
Qty: 3 | Refills: 0 | Status: ACTIVE | COMMUNITY
Start: 2023-06-13 | End: 1900-01-01

## 2023-08-01 ENCOUNTER — APPOINTMENT (OUTPATIENT)
Dept: ENDOCRINOLOGY | Facility: CLINIC | Age: 38
End: 2023-08-01
Payer: COMMERCIAL

## 2023-08-01 VITALS
HEART RATE: 68 BPM | HEIGHT: 73 IN | SYSTOLIC BLOOD PRESSURE: 113 MMHG | BODY MASS INDEX: 31.94 KG/M2 | WEIGHT: 241 LBS | DIASTOLIC BLOOD PRESSURE: 74 MMHG

## 2023-08-01 DIAGNOSIS — E10.649 TYPE 1 DIABETES MELLITUS WITH HYPOGLYCEMIA W/OUT COMA: ICD-10-CM

## 2023-08-01 LAB — GLUCOSE BLDC GLUCOMTR-MCNC: 77

## 2023-08-01 PROCEDURE — 99215 OFFICE O/P EST HI 40 MIN: CPT | Mod: 25

## 2023-08-01 PROCEDURE — 82962 GLUCOSE BLOOD TEST: CPT

## 2023-08-01 RX ORDER — LANCETS 28 GAUGE
EACH MISCELLANEOUS
Qty: 6 | Refills: 3 | Status: ACTIVE | COMMUNITY
Start: 2018-05-18 | End: 1900-01-01

## 2023-08-01 RX ORDER — BLOOD SUGAR DIAGNOSTIC
STRIP MISCELLANEOUS
Qty: 600 | Refills: 3 | Status: ACTIVE | COMMUNITY
Start: 2018-05-18 | End: 1900-01-01

## 2023-08-08 NOTE — ASSESSMENT
[FreeTextEntry1] : 1) DM1 :. A1C at target. No reported complications. Reported resolved hypoglycemia w/o alarm symptoms after successful CGM education. Appetite and diet appropriate. We discussed splitting his carbs more evenly to avoid post prandial hyperglycemia and to adjust his I to C to 12. I agree with the need for increased monitoring at least 10 times daily.  Ophtho referral provided, has Podiatry referral,  UTD for 2023. Evaluate annual microalbumin and lipid panel (not on a statin). Given glucose variability and hypoglycemia unawareness in spite of FSG monitoring 15 times daily, he benefits from CGM monitoring. Dariel 3 device prescribed. consider metformin to reduce insulin resistence and aid with weight gain in the future. explained the potential high risk and toxicities with chronic insulin use including, but not limited to life threatening hypoglycemia and death, Verbalized understanding and agrees with treatment plan, will contact MD and seek emergency medical care if condition changes.  2) overweight: Class I, complicated by DM1. High risk of metabolic syndrome and future complications. Discussed options including meds, bariatric surgery and lifestyle modification. RB and alternatives discussed. Questions answered and she verbalized understanding. Refer to nutrition and start hypocaloric, hypocarb diet in addition to exercise regimen. Refer to  now. . If no 5-7% weight loss observed on f/u, will consider GLP-1 agonist initiation.    3) Hypothyroidism: Appears clinically euthyroid off of LT4. Reassess TFTs. reviewed s/s of hypothroidism   [Carbohydrate Consistent Diet] : carbohydrate consistent diet [Hypoglycemia Management] : hypoglycemia management [Action and use of Insulin] : action and use of short and long-acting insulin [Self Monitoring of Blood Glucose] : self monitoring of blood glucose

## 2023-08-10 LAB
ALBUMIN SERPL ELPH-MCNC: 5 G/DL
ALP BLD-CCNC: 49 U/L
ALT SERPL-CCNC: 27 U/L
ANION GAP SERPL CALC-SCNC: 14 MMOL/L
AST SERPL-CCNC: 28 U/L
BILIRUB SERPL-MCNC: 0.7 MG/DL
BUN SERPL-MCNC: 23 MG/DL
CALCIUM SERPL-MCNC: 9.5 MG/DL
CHLORIDE SERPL-SCNC: 103 MMOL/L
CO2 SERPL-SCNC: 24 MMOL/L
CREAT SERPL-MCNC: 1.24 MG/DL
CREAT SPEC-SCNC: 74 MG/DL
EGFR: 76 ML/MIN/1.73M2
ESTIMATED AVERAGE GLUCOSE: 140 MG/DL
GLUCOSE SERPL-MCNC: 65 MG/DL
HBA1C MFR BLD HPLC: 6.5 %
MICROALBUMIN 24H UR DL<=1MG/L-MCNC: <1.2 MG/DL
MICROALBUMIN/CREAT 24H UR-RTO: NORMAL MG/G
POTASSIUM SERPL-SCNC: 4.4 MMOL/L
PROT SERPL-MCNC: 6.9 G/DL
SODIUM SERPL-SCNC: 141 MMOL/L
T4 FREE SERPL-MCNC: 1.1 NG/DL
TSH SERPL-ACNC: 2.64 UIU/ML

## 2023-08-17 ENCOUNTER — TRANSCRIPTION ENCOUNTER (OUTPATIENT)
Age: 38
End: 2023-08-17

## 2024-06-25 ENCOUNTER — APPOINTMENT (OUTPATIENT)
Dept: ENDOCRINOLOGY | Facility: CLINIC | Age: 39
End: 2024-06-25
Payer: COMMERCIAL

## 2024-06-25 VITALS
HEART RATE: 60 BPM | SYSTOLIC BLOOD PRESSURE: 118 MMHG | DIASTOLIC BLOOD PRESSURE: 78 MMHG | WEIGHT: 238 LBS | BODY MASS INDEX: 31.4 KG/M2

## 2024-06-25 DIAGNOSIS — R68.82 DECREASED LIBIDO: ICD-10-CM

## 2024-06-25 DIAGNOSIS — E10.9 TYPE 1 DIABETES MELLITUS W/OUT COMPLICATIONS: ICD-10-CM

## 2024-06-25 DIAGNOSIS — E06.3 OTHER SPECIFIED HYPOTHYROIDISM: ICD-10-CM

## 2024-06-25 DIAGNOSIS — E03.8 OTHER SPECIFIED HYPOTHYROIDISM: ICD-10-CM

## 2024-06-25 LAB
GLUCOSE BLDC GLUCOMTR-MCNC: 139
HBA1C MFR BLD HPLC: 6.1

## 2024-06-25 PROCEDURE — 82962 GLUCOSE BLOOD TEST: CPT

## 2024-06-25 PROCEDURE — 99215 OFFICE O/P EST HI 40 MIN: CPT | Mod: 25

## 2024-06-25 PROCEDURE — 83036 HEMOGLOBIN GLYCOSYLATED A1C: CPT | Mod: QW

## 2024-06-25 RX ORDER — BLOOD-GLUCOSE SENSOR
EACH MISCELLANEOUS
Qty: 6 | Refills: 3 | Status: ACTIVE | COMMUNITY
Start: 2022-07-13 | End: 1900-01-01

## 2024-06-25 RX ORDER — PEN NEEDLE, DIABETIC 29 G X1/2"
29G X 12.7MM NEEDLE, DISPOSABLE MISCELLANEOUS
Qty: 100 | Refills: 3 | Status: ACTIVE | COMMUNITY
Start: 2021-03-17 | End: 1900-01-01

## 2024-06-25 RX ORDER — INSULIN ASPART 100 [IU]/ML
100 INJECTION, SOLUTION INTRAVENOUS; SUBCUTANEOUS
Qty: 4 | Refills: 3 | Status: ACTIVE | COMMUNITY
Start: 2019-02-04 | End: 1900-01-01

## 2024-06-25 RX ORDER — INSULIN GLARGINE 100 [IU]/ML
100 INJECTION, SOLUTION SUBCUTANEOUS
Qty: 5 | Refills: 3 | Status: ACTIVE | COMMUNITY
Start: 2019-10-30 | End: 1900-01-01

## 2024-08-01 PROBLEM — E06.3 HYPOTHYROIDISM DUE TO HASHIMOTO'S THYROIDITIS: Status: ACTIVE | Noted: 2017-07-31

## 2024-10-07 DIAGNOSIS — E10.9 TYPE 1 DIABETES MELLITUS W/OUT COMPLICATIONS: ICD-10-CM

## 2024-10-07 RX ORDER — GLUCAGON INJECTION, SOLUTION 0.5 MG/.1ML
0.5 INJECTION, SOLUTION SUBCUTANEOUS DAILY
Qty: 2 | Refills: 1 | Status: ACTIVE | COMMUNITY
Start: 2024-10-07 | End: 1900-01-01

## 2024-10-25 DIAGNOSIS — E10.649 TYPE 1 DIABETES MELLITUS WITH HYPOGLYCEMIA W/OUT COMA: ICD-10-CM

## 2024-10-25 RX ORDER — GLUCAGON 1 MG
1 KIT INJECTION DAILY
Qty: 10 | Refills: 0 | Status: ACTIVE | COMMUNITY
Start: 2024-10-25 | End: 1900-01-01

## 2024-10-28 ENCOUNTER — RX RENEWAL (OUTPATIENT)
Age: 39
End: 2024-10-28

## 2025-01-31 ENCOUNTER — TRANSCRIPTION ENCOUNTER (OUTPATIENT)
Age: 40
End: 2025-01-31

## 2025-03-26 ENCOUNTER — RX RENEWAL (OUTPATIENT)
Age: 40
End: 2025-03-26

## 2025-04-30 ENCOUNTER — APPOINTMENT (OUTPATIENT)
Dept: ENDOCRINOLOGY | Facility: CLINIC | Age: 40
End: 2025-04-30

## 2025-04-30 VITALS
WEIGHT: 244 LBS | HEART RATE: 71 BPM | DIASTOLIC BLOOD PRESSURE: 76 MMHG | HEIGHT: 73 IN | BODY MASS INDEX: 32.34 KG/M2 | SYSTOLIC BLOOD PRESSURE: 126 MMHG

## 2025-04-30 DIAGNOSIS — E66.9 OBESITY, UNSPECIFIED: ICD-10-CM

## 2025-04-30 DIAGNOSIS — E10.649 TYPE 1 DIABETES MELLITUS WITH HYPOGLYCEMIA W/OUT COMA: ICD-10-CM

## 2025-04-30 DIAGNOSIS — E10.9 TYPE 1 DIABETES MELLITUS W/OUT COMPLICATIONS: ICD-10-CM

## 2025-04-30 LAB
GLUCOSE BLDC GLUCOMTR-MCNC: 93
HBA1C MFR BLD HPLC: 6.5

## 2025-04-30 PROCEDURE — 83036 HEMOGLOBIN GLYCOSYLATED A1C: CPT | Mod: QW

## 2025-04-30 PROCEDURE — 82962 GLUCOSE BLOOD TEST: CPT

## 2025-04-30 PROCEDURE — 99215 OFFICE O/P EST HI 40 MIN: CPT

## 2025-05-10 LAB
ALBUMIN SERPL ELPH-MCNC: 4.6 G/DL
ALP BLD-CCNC: 46 U/L
ALT SERPL-CCNC: 25 U/L
ANION GAP SERPL CALC-SCNC: 15 MMOL/L
AST SERPL-CCNC: 25 U/L
BILIRUB SERPL-MCNC: 0.4 MG/DL
BUN SERPL-MCNC: 19 MG/DL
CALCIUM SERPL-MCNC: 8.9 MG/DL
CHLORIDE SERPL-SCNC: 105 MMOL/L
CHOLEST SERPL-MCNC: 191 MG/DL
CO2 SERPL-SCNC: 20 MMOL/L
CREAT SERPL-MCNC: 1.16 MG/DL
CREAT SPEC-SCNC: 124 MG/DL
EGFRCR SERPLBLD CKD-EPI 2021: 81 ML/MIN/1.73M2
GLUCOSE SERPL-MCNC: 165 MG/DL
HDLC SERPL-MCNC: 63 MG/DL
LDLC SERPL-MCNC: 116 MG/DL
MICROALBUMIN 24H UR DL<=1MG/L-MCNC: <1.2 MG/DL
MICROALBUMIN/CREAT 24H UR-RTO: NORMAL MG/G
NONHDLC SERPL-MCNC: 128 MG/DL
POTASSIUM SERPL-SCNC: 4.1 MMOL/L
PROT SERPL-MCNC: 6.2 G/DL
SODIUM SERPL-SCNC: 139 MMOL/L
T3 SERPL-MCNC: 88 NG/DL
T4 FREE SERPL-MCNC: 1 NG/DL
TRIGL SERPL-MCNC: 66 MG/DL
TSH SERPL-ACNC: 3.39 UIU/ML

## 2025-05-23 ENCOUNTER — RX RENEWAL (OUTPATIENT)
Age: 40
End: 2025-05-23

## 2025-05-30 ENCOUNTER — APPOINTMENT (OUTPATIENT)
Dept: ENDOCRINOLOGY | Facility: CLINIC | Age: 40
End: 2025-05-30